# Patient Record
Sex: FEMALE | Race: ASIAN | NOT HISPANIC OR LATINO
[De-identification: names, ages, dates, MRNs, and addresses within clinical notes are randomized per-mention and may not be internally consistent; named-entity substitution may affect disease eponyms.]

---

## 2020-07-10 PROBLEM — Z00.00 ENCOUNTER FOR PREVENTIVE HEALTH EXAMINATION: Status: ACTIVE | Noted: 2020-07-10

## 2020-08-11 ENCOUNTER — APPOINTMENT (OUTPATIENT)
Dept: NEPHROLOGY | Facility: CLINIC | Age: 75
End: 2020-08-11
Payer: MEDICARE

## 2020-08-11 VITALS
SYSTOLIC BLOOD PRESSURE: 134 MMHG | TEMPERATURE: 98 F | RESPIRATION RATE: 16 BRPM | HEIGHT: 59 IN | WEIGHT: 179 LBS | HEART RATE: 74 BPM | BODY MASS INDEX: 36.08 KG/M2 | OXYGEN SATURATION: 90 % | DIASTOLIC BLOOD PRESSURE: 64 MMHG

## 2020-08-11 VITALS — SYSTOLIC BLOOD PRESSURE: 130 MMHG | DIASTOLIC BLOOD PRESSURE: 62 MMHG

## 2020-08-11 DIAGNOSIS — Z83.3 FAMILY HISTORY OF DIABETES MELLITUS: ICD-10-CM

## 2020-08-11 DIAGNOSIS — Z63.4 DISAPPEARANCE AND DEATH OF FAMILY MEMBER: ICD-10-CM

## 2020-08-11 PROCEDURE — 99204 OFFICE O/P NEW MOD 45 MIN: CPT

## 2020-08-11 RX ORDER — LEVOTHYROXINE SODIUM 0.09 MG/1
88 TABLET ORAL DAILY
Refills: 0 | Status: ACTIVE | COMMUNITY

## 2020-08-11 SDOH — SOCIAL STABILITY - SOCIAL INSECURITY: DISSAPEARANCE AND DEATH OF FAMILY MEMBER: Z63.4

## 2020-08-11 NOTE — ASSESSMENT
[FreeTextEntry1] : Cathy Coleman is a 74-year old woman with a past medical history significant for hypertension, diabetes mellitus since 1976 with both peripheral neuropathy and diabetic retinopathy, anemia currently treated with Aranesp, congestive heart failure, and chronic kidney disease.   Her recent  BUN/creatinine levels have trended as follows:  87/3.59 (06/19/2020),  78/3.45 (07/10/2020), 76/3.3 (07/20/2020), 78/3.3 (08/10/2020).  Her urine microalbumin to creatinine ratio was 3239 mcg per mg on 11/23/2019.  Ms. Coleman takes losartan-HCTZ and has for years.  She was taking furosemide 40 mg daily though recently increased the dose due to her recent fluid retention and worsening dyspnea with exertion.  \par \par \par IMPRESSION:\par \par (1) Stage IV CKD.  I am unsure if the serum creatinine values above are new or are just part of a long progression.  The presence of nephrotic range proteinuria and long history of diabetes mellitus is highly suggestive of diabetic nephropathy, a progressive disease.  Mr. Coleman is tired (with anemia)  though has not other uremic signs or symptoms.  \par \par (2) Anemia.  Although I can not rule out other etiologies at this time, this level of CKD can surely result in signficant anemia.  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  Well controlled.\par \par (4) Proteinuria.  See above.  Can not rule out other etiologies.\par \par (5) CHF/edema.  Ms. Coleman appears to be in congestive heart failure.  She recently increased her furosemide from 40 mg daily to 80 mg daily.\par \par (6) Hyperkalemia.\par \par RECOMMENDATIONS:\par \par (1) I will obtain the last 1-2 years of chemistries from your office.\par (2) i will review Dr. Rex Eaton's full workup. \par (3) Ms. Coleman will get the name of her cardiologist to me.  I would like to see his impression and obtain the result of her most recent echocardiogram. She last saw him in 2019.  I will call the cardiology group at [facility] and find out the results of the recent echocardiogram and hospital workup. \par (4) I ordered a full nephrotic range proteinuria workup and renal ultrasound.\par (5) Continue the furosemide 80 mg once daily.  She will go back to the 40 mg tablets after a 10 pound weight loss.\par (6) Keep salt out of the diet.\par (7) Avoid the following foods: potatoes, tomatoes, avocados, melons, bananas, oranges. \par (8) Ms. Coleman might very well benefit from intravenous iron\par \par I will see Ms. Coleman in one month.  She will obtain the lab studies in 1 week.

## 2020-08-11 NOTE — HISTORY OF PRESENT ILLNESS
[FreeTextEntry1] : Cathy Coleman is a 74-year old woman initially from [facility] who emigrated to the [place] in [place].  She has a past medical history significant for hypertension, diabetes mellitus since 1976 with both peripheral neuropathy and diabetic retinopathy, anemia thought to be due to a combination of iron deficiency and kidney disease currently treated with Aranesp, congestive heart failure, and chronic kidney disease.  She sees Dr. Rex Eaton of hematology.  Her BUN/creatinine levels have trended as follows:  87/3.59 (06/19/2020),  78/3.45 (07/10/2020), 76/3.3 (07/20/2020), 78/3.3 (08/10/2020).  Her urine microalbumin to creatinine ratio was 3239 mcg per mg on 11/23/2019.  Ms. Coleman feels "a little weak" though otherwise okay.  Her energy levels have been "down for the last 3 or 4 months". \par \par Ms. Coleman was admitted to [facility] in October 2019.  During that admission she had her furosemide dose reduced to 40 mg daily.  In addition, she has taken losartan-HCTZ for a number of years.

## 2020-08-11 NOTE — REVIEW OF SYSTEMS
[Feeling Tired] : feeling tired [Lower Ext Edema] : lower extremity edema [Cough] : cough [SOB on Exertion] : shortness of breath during exertion [As Noted in HPI] : as noted in HPI [Negative] : Musculoskeletal [Chills] : no chills [Fever] : no fever [FreeTextEntry2] : weight gain over the past few weeks "I think I retain water" [FreeTextEntry6] : no recent episodes of PND, sleeps on 2 pillows, denies orthopnea [FreeTextEntry7] : loose stools twice daily

## 2020-08-11 NOTE — CONSULT LETTER
[Dear  ___] : Dear  [unfilled], [Consult Letter:] : I had the pleasure of evaluating your patient, [unfilled]. [Sincerely,] : Sincerely, [Consult Closing:] : Thank you very much for allowing me to participate in the care of this patient.  If you have any questions, please do not hesitate to contact me. [DrKarin  ___] : Dr. HERNANDEZ [FreeTextEntry3] : Joe

## 2020-08-11 NOTE — PHYSICAL EXAM
[General Appearance - In No Acute Distress] : in no acute distress [General Appearance - Alert] : alert [Extraocular Movements] : extraocular movements were intact [Sclera] : the sclera and conjunctiva were normal [Respiration, Rhythm And Depth] : normal respiratory rhythm and effort [] : no respiratory distress [Exaggerated Use Of Accessory Muscles For Inspiration] : no accessory muscle use [Heart Rate And Rhythm] : heart rate was normal and rhythm regular [Heart Sounds] : normal S1 and S2 [Heart Sounds Gallop] : no gallops [Heart Sounds Pericardial Friction Rub] : no pericardial rub [Bowel Sounds] : normal bowel sounds [Abdomen Tenderness] : non-tender [No CVA Tenderness] : no ~M costovertebral angle tenderness [No Spinal Tenderness] : no spinal tenderness [Nail Clubbing] : no clubbing  or cyanosis of the fingernails [Involuntary Movements] : no involuntary movements were seen [No Focal Deficits] : no focal deficits [Skin Color & Pigmentation] : normal skin color and pigmentation [Affect] : the affect was normal [Impaired Insight] : insight and judgment were intact [Oriented To Time, Place, And Person] : oriented to person, place, and time [Mood] : the mood was normal [FreeTextEntry1] : no asterixis or clonus

## 2020-10-01 ENCOUNTER — APPOINTMENT (OUTPATIENT)
Dept: NEPHROLOGY | Facility: CLINIC | Age: 75
End: 2020-10-01
Payer: MEDICARE

## 2020-10-01 VITALS
RESPIRATION RATE: 16 BRPM | TEMPERATURE: 97.7 F | WEIGHT: 170 LBS | BODY MASS INDEX: 34.27 KG/M2 | DIASTOLIC BLOOD PRESSURE: 72 MMHG | SYSTOLIC BLOOD PRESSURE: 132 MMHG | OXYGEN SATURATION: 96 % | HEIGHT: 59 IN | HEART RATE: 74 BPM

## 2020-10-01 VITALS — DIASTOLIC BLOOD PRESSURE: 74 MMHG | SYSTOLIC BLOOD PRESSURE: 128 MMHG

## 2020-10-01 DIAGNOSIS — E11.9 TYPE 2 DIABETES MELLITUS W/OUT COMPLICATIONS: ICD-10-CM

## 2020-10-01 DIAGNOSIS — R53.83 OTHER FATIGUE: ICD-10-CM

## 2020-10-01 DIAGNOSIS — I10 ESSENTIAL (PRIMARY) HYPERTENSION: ICD-10-CM

## 2020-10-01 PROCEDURE — 99214 OFFICE O/P EST MOD 30 MIN: CPT

## 2020-10-01 NOTE — CONSULT LETTER
[Dear  ___] : Dear  [unfilled], [Consult Letter:] : I had the pleasure of evaluating your patient, [unfilled]. [Consult Closing:] : Thank you very much for allowing me to participate in the care of this patient.  If you have any questions, please do not hesitate to contact me. [Sincerely,] : Sincerely, [FreeTextEntry3] : Joe [DrKarin  ___] : Dr. HERNANDEZ

## 2020-10-01 NOTE — REVIEW OF SYSTEMS
[Fever] : no fever [Chills] : no chills [Feeling Tired] : feeling tired [Lower Ext Edema] : lower extremity edema [Cough] : cough [SOB on Exertion] : shortness of breath during exertion [Itching] : itching [As Noted in HPI] : as noted in HPI [Easy Bruising] : a tendency for easy bruising [Negative] : Endocrine [FreeTextEntry4] : occasional "runny nose" [FreeTextEntry5] : improved [FreeTextEntry6] : no recent episodes of PND, sleeps on 2 pillows, denies orthopnea, short of breath when climbing stairs [FreeTextEntry7] : loose stools twice daily [FreeTextEntry9] : cramping in the legs "mostly when I go to bed" [de-identified] : "sometimes my legs"

## 2020-10-01 NOTE — HISTORY OF PRESENT ILLNESS
[FreeTextEntry1] : Cathy Coleman is a 75-year old woman initially from [facility] who emigrated to the [place] in [place].  She has a past medical history significant for hypertension, diabetes mellitus since 1976 with both peripheral neuropathy and diabetic retinopathy, anemia thought to be due to a combination of iron deficiency and kidney disease currently treated with Aranesp, congestive heart failure, and chronic kidney disease.  She sees Dr. Rex Eaton of hematology.  Her BUN/creatinine levels have trended as follows:  87/3.59 (06/19/2020),  78/3.45 (07/10/2020), 76/3.3 (07/20/2020), 78/3.3 (08/10/2020).  Her urine microalbumin to creatinine ratio was 3239 mcg per mg on 11/23/2019.  \par \par Ms. Coleman was admitted to [facility] in October 2019.  During that admission she had her furosemide dose reduced to 40 mg daily.  In addition, she has taken losartan-HCTZ for a number of years.\par \par Ms. Coleman is here for follow up.  She feels well.  She denies having any nausea or vomiting.  She has an occasional cough and develops dyspnea only when climbing stairs. She is "unsteady" on her feet. She has not had any falls.

## 2020-10-01 NOTE — PHYSICAL EXAM
[General Appearance - Alert] : alert [General Appearance - In No Acute Distress] : in no acute distress [Sclera] : the sclera and conjunctiva were normal [Extraocular Movements] : extraocular movements were intact [Neck Appearance] : the appearance of the neck was normal [] : no respiratory distress [Respiration, Rhythm And Depth] : normal respiratory rhythm and effort [Exaggerated Use Of Accessory Muscles For Inspiration] : no accessory muscle use [Heart Rate And Rhythm] : heart rate was normal and rhythm regular [Heart Sounds] : normal S1 and S2 [Heart Sounds Gallop] : no gallops [Heart Sounds Pericardial Friction Rub] : no pericardial rub [Full Pulse] : the pedal pulses are present [Bowel Sounds] : normal bowel sounds [Abdomen Tenderness] : non-tender [No CVA Tenderness] : no ~M costovertebral angle tenderness [No Spinal Tenderness] : no spinal tenderness [Nail Clubbing] : no clubbing  or cyanosis of the fingernails [Involuntary Movements] : no involuntary movements were seen [Skin Color & Pigmentation] : normal skin color and pigmentation [No Focal Deficits] : no focal deficits [FreeTextEntry1] : no asterixis or clonus [Oriented To Time, Place, And Person] : oriented to person, place, and time [Impaired Insight] : insight and judgment were intact [Affect] : the affect was normal [Mood] : the mood was normal

## 2020-10-01 NOTE — ASSESSMENT
[FreeTextEntry1] : Cathy Coleman is a 75-year old woman with a past medical history significant for hypertension, diabetes mellitus since 1976 with both peripheral neuropathy and diabetic retinopathy, anemia currently treated with Aranesp, congestive heart failure, and chronic kidney disease.   Her recent  BUN/creatinine levels have trended as follows:  87/3.59 (06/19/2020),  78/3.45 (07/10/2020), 76/3.3 (07/20/2020), 78/3.3 (08/10/2020), and most recently 98/4.7 (08/31/2020) with a serum potassium of 5.9 meq per liter.   Her urine microalbumin to creatinine ratio was 3239 mcg per mg on 11/23/2019.  Ms. Coleman takes losartan-HCTZ and has for years.  She was taking furosemide 40 mg daily though increased the dose to 80 mg once daily for 5 days prior to her first visit with me on 08/11/2020 (due to her recent fluid retention and worsening dyspnea with exertion)..  \par \par \par IMPRESSION:\par \par (1) Stage IV CKD with DARI.   I am unsure if the serum creatinine values above are new or are just part of a long progression.  The presence of nephrotic range proteinuria and long history of diabetes mellitus is highly suggestive of diabetic nephropathy, a progressive disease.  Mr. Coleman is tired (with anemia)  though has no other uremic signs or symptoms.  \par \par (2) Anemia.  Although I can not rule out other etiologies at this time, this level of CKD can surely result in significant anemia.  Ms. Coleman had functional iron deficiency.  This has improved with oral iron (TSAT 11.5 --> 31%%, ferritin 215.1 --> 303 ng/mL)\par \par (3) Hypertension.  Well controlled.\par \par (4) Proteinuria.  See above.  Can not rule out other etiologies. Unfortunately the nephrotic range proteinuria workup was not done. \par \par (5) CHF/edema.  Ms. Coleman has bibasilar rales.  I suspect the furosemide is not as effective with the serum creatinine of 4.7 meq per liter.\par \par (6) Hyperkalemia.\par \par RECOMMENDATIONS:\par \par (1) I will obtain the last 1-2 years of chemistries from your office.\par (2) I ordered a full nephrotic range proteinuria\par (3) Urinalysis and random urine protein to creatinine ratio.\par (4) Stop taking losartan/HCTZ for now.  Follow blood pressures at home. \par (5) Renal ultrasound\par (6)  Avoid the following foods: potatoes, tomatoes, avocados, melons, bananas, oranges. \par \par I will see Ms. Coleman in one month.  She will obtain the lab studies in 2 weeks.

## 2020-12-10 ENCOUNTER — APPOINTMENT (OUTPATIENT)
Dept: NEPHROLOGY | Facility: CLINIC | Age: 75
End: 2020-12-10

## 2021-01-19 ENCOUNTER — APPOINTMENT (OUTPATIENT)
Dept: NEPHROLOGY | Facility: CLINIC | Age: 76
End: 2021-01-19
Payer: MEDICARE

## 2021-01-19 ENCOUNTER — APPOINTMENT (OUTPATIENT)
Dept: NEPHROLOGY | Facility: CLINIC | Age: 76
End: 2021-01-19

## 2021-01-19 VITALS
HEART RATE: 90 BPM | BODY MASS INDEX: 31.65 KG/M2 | TEMPERATURE: 98 F | OXYGEN SATURATION: 98 % | DIASTOLIC BLOOD PRESSURE: 60 MMHG | RESPIRATION RATE: 16 BRPM | SYSTOLIC BLOOD PRESSURE: 126 MMHG | HEIGHT: 59 IN | WEIGHT: 157 LBS

## 2021-01-19 DIAGNOSIS — D50.9 IRON DEFICIENCY ANEMIA, UNSPECIFIED: ICD-10-CM

## 2021-01-19 PROCEDURE — 99072 ADDL SUPL MATRL&STAF TM PHE: CPT

## 2021-01-19 PROCEDURE — 99214 OFFICE O/P EST MOD 30 MIN: CPT

## 2021-01-19 RX ORDER — LOSARTAN POTASSIUM AND HYDROCHLOROTHIAZIDE 12.5; 1 MG/1; MG/1
100-12.5 TABLET ORAL DAILY
Refills: 0 | Status: DISCONTINUED | COMMUNITY
End: 2021-01-19

## 2021-01-19 RX ORDER — DORZOLAMIDE HYDROCHLORIDE TIMOLOL MALEATE 20; 5 MG/ML; MG/ML
22.3-6.8 SOLUTION/ DROPS OPHTHALMIC TWICE DAILY
Refills: 0 | Status: ACTIVE | COMMUNITY

## 2021-01-19 RX ORDER — FUROSEMIDE 40 MG/1
40 TABLET ORAL DAILY
Refills: 0 | Status: DISCONTINUED | COMMUNITY
End: 2021-01-19

## 2021-01-19 RX ORDER — MIDODRINE HYDROCHLORIDE 10 MG/1
10 TABLET ORAL
Refills: 0 | Status: ACTIVE | COMMUNITY

## 2021-01-19 RX ORDER — INSULIN GLARGINE 100 [IU]/ML
100 INJECTION, SOLUTION SUBCUTANEOUS
Refills: 0 | Status: ACTIVE | COMMUNITY

## 2021-01-19 RX ORDER — IPRATROPIUM BROMIDE 0.5 MG/2.5ML
0.02 SOLUTION RESPIRATORY (INHALATION)
Refills: 0 | Status: DISCONTINUED | COMMUNITY

## 2021-01-19 RX ORDER — BUDESONIDE 0.25 MG/2ML
0.25 SUSPENSION RESPIRATORY (INHALATION) TWICE DAILY
Refills: 0 | Status: ACTIVE | COMMUNITY

## 2021-01-19 NOTE — CONSULT LETTER
[Dear  ___] : Dear  [unfilled], [Consult Letter:] : I had the pleasure of evaluating your patient, [unfilled]. [Consult Closing:] : Thank you very much for allowing me to participate in the care of this patient.  If you have any questions, please do not hesitate to contact me. [Sincerely,] : Sincerely, [DrKarin  ___] : Dr. HERNANDEZ [DrKarin ___] : Dr. HERNANDEZ [FreeTextEntry3] : Joe

## 2021-01-19 NOTE — REVIEW OF SYSTEMS
[Lower Ext Edema] : lower extremity edema [As Noted in HPI] : as noted in HPI [Negative] : Endocrine [Fever] : no fever [Chills] : no chills [Shortness Of Breath] : no shortness of breath [Itching] : no itching [FreeTextEntry7] : loose stools twice daily [FreeTextEntry9] : cramping in the legs "mostly when I go to bed" [de-identified] : weak lower extremities [de-identified] : "sometimes my legs" [de-identified] : anemia is being treated

## 2021-01-19 NOTE — HISTORY OF PRESENT ILLNESS
[FreeTextEntry1] : Cathy Coleman is a 75-year old woman initially from [facility] who emigrated to the [place] in [place].  She has a past medical history significant for hypertension, diabetes mellitus since 1976 with both peripheral neuropathy and diabetic retinopathy, anemia thought to be due to a combination of iron deficiency and kidney disease currently treated with Aranesp, congestive heart failure, and chronic kidney disease.  She sees Dr. Rex Eaton of hematology.  Her BUN/creatinine levels have trended as follows:  87/3.59 (06/19/2020),  78/3.45 (07/10/2020), 76/3.3 (07/20/2020), 78/3.3 (08/10/2020).  Her urine microalbumin to creatinine ratio was 3239 mcg per mg on 11/23/2019.  \par \par Ms. Coleman is here for follow up.  She was admitted to [facility] in November 2020 with decopensated congestive heart failure.  She was started on intermittent hemodialyssis though did not tolerate UF.  She ultimately received CVVH at [facility] and was discharged on Bumex 6 mg TID.  Her serum creatinine has been running in the 7's.  She last received hemodialysis in the middle of December 2020.  Currently, Ms. Coleman is feeling "okay". She is a bit lightheaded upon wakening and her balance is poor.  She denies any nausea or vomiting.  Her appetite is good. \par

## 2021-01-19 NOTE — QUALITY MEASURES
[PTH level measured within last] : patient's PTH level measured within the last 12 months [Patient has proteinuria greater than] : patient has proteinuria greater than 1 gm (spot urine/protein creatinine ratio or a 24hr collection)

## 2021-01-19 NOTE — PHYSICAL EXAM
[General Appearance - Alert] : alert [General Appearance - In No Acute Distress] : in no acute distress [Sclera] : the sclera and conjunctiva were normal [Extraocular Movements] : extraocular movements were intact [Neck Appearance] : the appearance of the neck was normal [] : no respiratory distress [Respiration, Rhythm And Depth] : normal respiratory rhythm and effort [Exaggerated Use Of Accessory Muscles For Inspiration] : no accessory muscle use [Heart Rate And Rhythm] : heart rate was normal and rhythm regular [Heart Sounds] : normal S1 and S2 [Heart Sounds Gallop] : no gallops [Heart Sounds Pericardial Friction Rub] : no pericardial rub [Full Pulse] : the pedal pulses are present [Bowel Sounds] : normal bowel sounds [Abdomen Tenderness] : non-tender [Nail Clubbing] : no clubbing  or cyanosis of the fingernails [Involuntary Movements] : no involuntary movements were seen [Skin Color & Pigmentation] : normal skin color and pigmentation [No Focal Deficits] : no focal deficits [Impaired Insight] : insight and judgment were intact [Oriented To Time, Place, And Person] : oriented to person, place, and time [Affect] : the affect was normal [Mood] : the mood was normal [Edema] : there was no peripheral edema [Dialysis Catheter] : dialysis catheter [FreeTextEntry1] : no asterixis or clonus

## 2021-01-19 NOTE — ASSESSMENT
[FreeTextEntry1] : Cathy Coleman is a 75-year old woman with a past medical history significant for hypertension, diabetes mellitus since 1976 with both peripheral neuropathy and diabetic retinopathy, anemia currently treated with Aranesp, congestive heart failure, and chronic kidney disease.   Her  BUN/creatinine levels had trended as follows:  87/3.59 (06/19/2020),  78/3.45 (07/10/2020), 76/3.3 (07/20/2020), 78/3.3 (08/10/2020),  98/4.7 (08/31/2020).  She was admitted to [facility] where she was started on hemodialysis.  The day following the first hemodialysis session Ms. Coleman had a cardiac arrest.  She was revived and transferred to [facility] where she was treated for her CHF and progressive CKD via CVVH, and treated for pneumonia.  Ms. Coleman was discharged to rehabilitation ([facility]) where she recently started treatment for C. Diff. Colitis.  Her stools are no longer loose.  Her most recent serum creatinine levels have been running around 7 mg/dL.  She denies any uremic signs.  I could not find any uremic symptoms.  \par \par IMPRESSION:\par \par (1) Stage IV CKD progression on diuretics.  Currently stage V.  The presence of nephrotic range proteinuria and long history of diabetes mellitus is highly suggestive of diabetic nephropathy.  \par \par (2) Anemia.  Ms. Coleman has received Aranesp with Dr. Rex Eaton.  She has not received Aranesp in a few months. \par \par (3) Hypertension.  Well controlled.\par \par (4) Proteinuria.  See above.  Can not rule out other etiologies. Unfortunately the nephrotic range proteinuria workup was not done. \par \par (5) CHF/edema.  Ms. Coleman has lower lung field rales on exam. \par \par (6) Hyperkalemia.\par \par RECOMMENDATIONS:\par \par (1) Ms. Coleman should have the lab studies ordered below. \par (2) I suspect she will soon require the initiation of hemodialysis.\par (3) Given the cardiac arrest I hesitate to initiate hemodialysis at an outpatient center.  She will need to start dialysis in a hospital, ideally [facility] where I can follow her. \par \par THE TUNNELED DIALYSIS CATHETER NEEDS TO BE FLUSHED WITH NORMAL SALINE TWICE WEEKLY, AND THE BANDAGED CHANGED AT THIS TIME UNDER STERILE CONDITIONS.  BOTH NURSING AND THE PATIENT SHOULD MASK DURING THIS PROCEDURE. \par

## 2021-02-04 ENCOUNTER — APPOINTMENT (OUTPATIENT)
Dept: NEPHROLOGY | Facility: CLINIC | Age: 76
End: 2021-02-04

## 2021-03-02 ENCOUNTER — APPOINTMENT (OUTPATIENT)
Dept: NEPHROLOGY | Facility: CLINIC | Age: 76
End: 2021-03-02
Payer: MEDICARE

## 2021-03-02 VITALS
HEART RATE: 86 BPM | WEIGHT: 137.13 LBS | OXYGEN SATURATION: 95 % | HEIGHT: 59 IN | DIASTOLIC BLOOD PRESSURE: 62 MMHG | TEMPERATURE: 97.9 F | RESPIRATION RATE: 16 BRPM | SYSTOLIC BLOOD PRESSURE: 128 MMHG | BODY MASS INDEX: 27.64 KG/M2

## 2021-03-02 DIAGNOSIS — E79.0 HYPERURICEMIA W/OUT SIGNS OF INFLAMMATORY ARTHRITIS AND TOPHACEOUS DISEASE: ICD-10-CM

## 2021-03-02 DIAGNOSIS — E83.40 DISORDERS OF MAGNESIUM METABOLISM, UNSPECIFIED: ICD-10-CM

## 2021-03-02 DIAGNOSIS — D46.4 REFRACTORY ANEMIA, UNSPECIFIED: ICD-10-CM

## 2021-03-02 DIAGNOSIS — E55.9 VITAMIN D DEFICIENCY, UNSPECIFIED: ICD-10-CM

## 2021-03-02 DIAGNOSIS — R80.9 PROTEINURIA, UNSPECIFIED: ICD-10-CM

## 2021-03-02 PROCEDURE — 99214 OFFICE O/P EST MOD 30 MIN: CPT

## 2021-03-02 PROCEDURE — 99072 ADDL SUPL MATRL&STAF TM PHE: CPT

## 2021-03-02 RX ORDER — INSULIN HUMAN 100 [IU]/ML
100 INJECTION, SUSPENSION SUBCUTANEOUS
Refills: 0 | Status: DISCONTINUED | COMMUNITY
End: 2021-03-02

## 2021-03-02 RX ORDER — SIMVASTATIN 20 MG/1
20 TABLET, FILM COATED ORAL DAILY
Refills: 0 | Status: DISCONTINUED | COMMUNITY
End: 2021-03-02

## 2021-03-02 RX ORDER — VANCOMYCIN HYDROCHLORIDE 1 G/200ML
1 INJECTION, SOLUTION INTRAVENOUS
Refills: 0 | Status: DISCONTINUED | COMMUNITY
End: 2021-03-02

## 2021-03-02 RX ORDER — SODIUM CHLORIDE 0.65 %
0.65 AEROSOL, SPRAY (ML) NASAL
Refills: 0 | Status: ACTIVE | COMMUNITY

## 2021-03-02 NOTE — PHYSICAL EXAM
[General Appearance - Alert] : alert [General Appearance - In No Acute Distress] : in no acute distress [Sclera] : the sclera and conjunctiva were normal [Extraocular Movements] : extraocular movements were intact [Neck Appearance] : the appearance of the neck was normal [] : no respiratory distress [Respiration, Rhythm And Depth] : normal respiratory rhythm and effort [Exaggerated Use Of Accessory Muscles For Inspiration] : no accessory muscle use [Heart Rate And Rhythm] : heart rate was normal and rhythm regular [Heart Sounds] : normal S1 and S2 [Heart Sounds Gallop] : no gallops [Heart Sounds Pericardial Friction Rub] : no pericardial rub [Full Pulse] : the pedal pulses are present [Edema] : there was no peripheral edema [Bowel Sounds] : normal bowel sounds [Abdomen Tenderness] : non-tender [Nail Clubbing] : no clubbing  or cyanosis of the fingernails [Involuntary Movements] : no involuntary movements were seen [Dialysis Catheter] : dialysis catheter [Skin Color & Pigmentation] : normal skin color and pigmentation [No Focal Deficits] : no focal deficits [Oriented To Time, Place, And Person] : oriented to person, place, and time [Impaired Insight] : insight and judgment were intact [Affect] : the affect was normal [Mood] : the mood was normal [Abdomen Soft] : soft [No CVA Tenderness] : no ~M costovertebral angle tenderness [No Spinal Tenderness] : no spinal tenderness [FreeTextEntry1] : no asterixis or clonus

## 2021-03-02 NOTE — HISTORY OF PRESENT ILLNESS
[FreeTextEntry1] : Cathy Coleman is a 75-year old woman initially from [facility] who emigrated to [place] in [place].  She has a past medical history significant for hypertension, diabetes mellitus since 1976 with both peripheral neuropathy and diabetic retinopathy, anemia thought to be due to a combination of iron deficiency and kidney disease currently treated with Aranesp, congestive heart failure, and chronic kidney disease.  She sees Dr. Rex Eaton of hematology.    Her urine microalbumin to creatinine ratio was 3239 mcg per mg on 11/23/2019.  \par \par Ms. Coleman is here for follow up.  She was admitted to [facility] in November 2020 with decompensated congestive heart failure.  She was started on intermittent hemodialyssis though did not tolerate UF.  She ultimately received CVVH at [facility] and was discharged on Bumex 6 mg TID.  Her serum creatinine had been running in the 7's.  She last received hemodialysis in the middle of December 2020.  \par \par I called Ms. Coleman on 01/25/2021 during her stay in [facility] in [place] regarding a Hb level of 6.8 g/dL.  I spoke with Nelson Pedraza RN.  She was feeling dizzy with a headache.  At my request she was sent to [facility] where she received 2 units of packed red cells.   Currently, Ms. Coleman is feeling "okay". She is able to ambulate short distances with help.  Her appetite is "not that good".  She has nausea on some days though has no vomiting.  It "passes" after drinking water or juice. \par

## 2021-03-02 NOTE — REVIEW OF SYSTEMS
[Lower Ext Edema] : lower extremity edema [As Noted in HPI] : as noted in HPI [Negative] : Gastrointestinal [Fever] : no fever [Chills] : no chills [Shortness Of Breath] : no shortness of breath [Itching] : no itching [FreeTextEntry7] : diarrhea stopped, currently with soft stools [FreeTextEntry8] : nocturia 2 [FreeTextEntry9] : cramping in legs has improved [de-identified] : "sometimes my legs" [de-identified] : weak lower extremities [de-identified] : anemia is being treated

## 2021-03-02 NOTE — ASSESSMENT
[FreeTextEntry1] : Cathy Coleman is a 75-year old woman with a past medical history significant for hypertension, diabetes mellitus since 1976 with both peripheral neuropathy and diabetic retinopathy, anemia currently treated with Aranesp, congestive heart failure, and chronic kidney disease.   Her  BUN/creatinine levels had trended as follows:  87/3.59 (06/19/2020),  78/3.45 (07/10/2020), 76/3.3 (07/20/2020), 78/3.3 (08/10/2020),  98/4.7 (08/31/2020), 89/5.30  She was admitted to [facility] where she was started on hemodialysis.  The day following the first hemodialysis session Ms. Coleman had a cardiac arrest.  She was revived and transferred to [facility] where she was treated for her CHF and progressive CKD via CVVH, and treated for pneumonia.  Ms. Coleman was discharged to rehabilitation ([facility]) where she recently started treatment for C. Diff. Colitis.  Her stools are no longer loose.  Following discharge her serum creatinine levels were running around 7 mg/dL.  The recent BUN/creatinine levels are 89/5.30 (03/01/2021). Ms. Coleman is without any uremic signs or symptoms. \par \par IMPRESSION:\par \par (1) Stage IV CKD progression on diuretics.  Currently stage V.  The presence of nephrotic range proteinuria and long history of diabetes mellitus is highly suggestive of diabetic nephropathy.  \par \par (2) Anemia.  Ms. Coleman received PRBC transfusions in late January.  Her Hb trend is as follows:  8.9 g/dL (02/15/2021),  7.9 g/dL (03/01/2021).. She is receiving Aranesp 60 mcg weekly.  I am unsure how long she has been on Aranesp. Her TSAT was 60% on 02/27/2021.  \par \par (3) Hypertension.  Well controlled.\par \par (4) Proteinuria.  See above.  Can not rule out other etiologies. U\par \par (5) CHF/edema.  Controlled. \par \par (6) Hyperkalemia. Controlled.  The recent K+ level was 3.5 meq/L (03/01/2021)\par \par (7) Tunneled right chest dialysis catheter. \par \par (8) Elevated white count.  Unclear etiology.  This needs follow up.\par \par RECOMMENDATIONS:\par \par (1) Ms. Coleman's son, Benson, will call the NH and find out how long the Aranesp has been on board.  I will make recommendations about changing the dose of the Aranesp as needed. \par (2) Ms. Coleman should have the catheter cared for as below.\par (3) Ms. Coleman should see Dr. Eaton after discharge. \par (4) I ordered the tests below to be done in around 2 weeks.\par (5) Given the cardiac arrest I hesitate to initiate hemodialysis at an outpatient center when it is needed.  She will need to start dialysis in a hospital, ideally [facility] where I can follow her. \par \par THE TUNNELED DIALYSIS CATHETER NEEDS TO BE FLUSHED WITH NORMAL SALINE TWICE WEEKLY, AND THE BANDAGED CHANGED AT THIS TIME UNDER STERILE CONDITIONS.  BOTH NURSING AND THE PATIENT SHOULD MASK DURING THIS PROCEDURE. \par

## 2021-04-20 ENCOUNTER — APPOINTMENT (OUTPATIENT)
Dept: NEPHROLOGY | Facility: CLINIC | Age: 76
End: 2021-04-20
Payer: MEDICARE

## 2021-04-20 VITALS
HEIGHT: 59 IN | DIASTOLIC BLOOD PRESSURE: 64 MMHG | SYSTOLIC BLOOD PRESSURE: 128 MMHG | TEMPERATURE: 97.3 F | OXYGEN SATURATION: 91 % | HEART RATE: 78 BPM | RESPIRATION RATE: 15 BRPM

## 2021-04-20 DIAGNOSIS — N19 UNSPECIFIED KIDNEY FAILURE: ICD-10-CM

## 2021-04-20 DIAGNOSIS — N17.9 ACUTE KIDNEY FAILURE, UNSPECIFIED: ICD-10-CM

## 2021-04-20 DIAGNOSIS — Z87.898 PERSONAL HISTORY OF OTHER SPECIFIED CONDITIONS: ICD-10-CM

## 2021-04-20 DIAGNOSIS — N18.5 CHRONIC KIDNEY DISEASE, STAGE 5: ICD-10-CM

## 2021-04-20 DIAGNOSIS — N18.9 CHRONIC KIDNEY DISEASE, UNSPECIFIED: ICD-10-CM

## 2021-04-20 DIAGNOSIS — E83.39 OTHER DISORDERS OF PHOSPHORUS METABOLISM: ICD-10-CM

## 2021-04-20 DIAGNOSIS — E21.3 HYPERPARATHYROIDISM, UNSPECIFIED: ICD-10-CM

## 2021-04-20 DIAGNOSIS — D63.1 CHRONIC KIDNEY DISEASE, UNSPECIFIED: ICD-10-CM

## 2021-04-20 PROCEDURE — 99072 ADDL SUPL MATRL&STAF TM PHE: CPT

## 2021-04-20 PROCEDURE — 99214 OFFICE O/P EST MOD 30 MIN: CPT

## 2021-04-20 RX ORDER — SEVELAMER CARBONATE 800 MG/1
800 TABLET, FILM COATED ORAL 3 TIMES DAILY
Refills: 0 | Status: DISCONTINUED | COMMUNITY
End: 2021-04-20

## 2021-04-20 RX ORDER — AZITHROMYCIN MONOHYDRATE 500 MG/5ML
500 INJECTION, POWDER, LYOPHILIZED, FOR SOLUTION INTRAVENOUS
Refills: 0 | Status: DISCONTINUED | COMMUNITY
End: 2021-04-20

## 2021-04-20 RX ORDER — LORATADINE 5 MG
17 TABLET,CHEWABLE ORAL DAILY
Refills: 0 | Status: DISCONTINUED | COMMUNITY
End: 2021-04-20

## 2021-04-20 RX ORDER — CHLORHEXIDINE GLUCONATE 4 %
325 (65 FE) LIQUID (ML) TOPICAL DAILY
Refills: 0 | Status: DISCONTINUED | COMMUNITY
End: 2021-04-20

## 2021-04-20 RX ORDER — ONDANSETRON 2 MG/ML
INJECTION INTRAMUSCULAR; INTRAVENOUS
Refills: 0 | Status: DISCONTINUED | COMMUNITY
End: 2021-04-20

## 2021-04-20 RX ORDER — BISMUTH SUBSALICYLATE 262 MG
262 TABLET,CHEWABLE ORAL EVERY 6 HOURS
Refills: 0 | Status: DISCONTINUED | COMMUNITY
End: 2021-04-20

## 2021-04-20 RX ORDER — CALCIUM CITRATE/VITAMIN D3 315MG-6.25
TABLET ORAL TWICE DAILY
Refills: 0 | Status: DISCONTINUED | COMMUNITY
End: 2021-04-20

## 2021-04-20 RX ORDER — ONDANSETRON 4 MG/1
4 TABLET, ORALLY DISINTEGRATING ORAL
Refills: 0 | Status: DISCONTINUED | COMMUNITY
End: 2021-04-20

## 2021-04-20 RX ORDER — GLUCOSAMINE HCL/CHONDROITIN SU 500-400 MG
3 CAPSULE ORAL AT BEDTIME
Refills: 0 | Status: DISCONTINUED | COMMUNITY
End: 2021-04-20

## 2021-04-20 RX ORDER — INSULIN LISPRO 100 U/ML
100 INJECTION, SOLUTION SUBCUTANEOUS
Refills: 0 | Status: DISCONTINUED | COMMUNITY
End: 2021-04-20

## 2021-04-20 NOTE — PHYSICAL EXAM
[General Appearance - Alert] : alert [General Appearance - In No Acute Distress] : in no acute distress [Sclera] : the sclera and conjunctiva were normal [Extraocular Movements] : extraocular movements were intact [Neck Appearance] : the appearance of the neck was normal [] : no respiratory distress [Respiration, Rhythm And Depth] : normal respiratory rhythm and effort [Exaggerated Use Of Accessory Muscles For Inspiration] : no accessory muscle use [Heart Rate And Rhythm] : heart rate was normal and rhythm regular [Heart Sounds] : normal S1 and S2 [Heart Sounds Gallop] : no gallops [Heart Sounds Pericardial Friction Rub] : no pericardial rub [Full Pulse] : the pedal pulses are present [Edema] : there was no peripheral edema [Bowel Sounds] : normal bowel sounds [Abdomen Soft] : soft [Abdomen Tenderness] : non-tender [Nail Clubbing] : no clubbing  or cyanosis of the fingernails [Involuntary Movements] : no involuntary movements were seen [Dialysis Catheter] : dialysis catheter [Skin Color & Pigmentation] : normal skin color and pigmentation [No Focal Deficits] : no focal deficits [Oriented To Time, Place, And Person] : oriented to person, place, and time [Impaired Insight] : insight and judgment were intact [Affect] : the affect was normal [Mood] : the mood was normal [FreeTextEntry1] : no asterixis or clonus

## 2021-04-20 NOTE — ASSESSMENT
[FreeTextEntry1] : Cathy Coleman is a 75-year old woman with a past medical history significant for hypertension, diabetes mellitus since 1976 with both peripheral neuropathy and diabetic retinopathy, anemia currently treated with Aranesp, congestive heart failure, and chronic kidney disease.   Her  BUN/creatinine levels had trended as follows:  87/3.59 (06/19/2020),  78/3.45 (07/10/2020), 76/3.3 (07/20/2020), 78/3.3 (08/10/2020),  98/4.7 (08/31/2020), 89/5.30  She was admitted to [facility] where she was started on hemodialysis.  The day following the first hemodialysis session Ms. Coleman had a cardiac arrest.  She was revived and transferred to [facility] where she was treated for her CHF and progressive CKD via CVVH, and treated for pneumonia.  Ms. Coleman was discharged to rehabilitation ([facility]) where she recently started treatment for C. Diff. Colitis.   The recent BUN/creatinine levels have trended as follows:  89/5.30 (03/01/2021), 145/6.42 (04/17/2021).  She is having mild uremic symptoms.  Her eGFR is 6 cc per minute. \par \par IMPRESSION:\par \par (1) Stage V CKD. Currently stage V.  The presence of nephrotic range proteinuria and long history of diabetes mellitus is highly suggestive of diabetic nephropathy.  She is having mild uremic symptoms.  I think Ms. Coleman will benefit from starting hemodialysis. \par \par (2) Anemia. The recent Hb is 7.1 g/dL.  Ms. Coleman denies any visible blood in her stools.  \par \par (3) Hypertension.  Well controlled.\par \par (4) Proteinuria.  See above.  Can not rule out other etiologies. U\par \par (5) CHF/edema.  Controlled. \par \par (6) Hyperkalemia. Controlled.  The recent K+ level was 4.6 meq/L\par \par (7) Tunneled right chest dialysis catheter.  This was recently exchanged. \par \par (8) Elevated white count.  Unclear etiology.  This appears to have resolved. \par \par (9) Hyperphosphatemia.  The serum phosphorous is 8.4 mg/dL.\par \par (10) Hyperparathyroidism.  The iPTH is quite elevated at 821 pg per mL.\par \par RECOMMENDATIONS:\par \par (1) I am recommending that Ms. Coleman initiate hemodialysis as an inpatient- given her risks.  I explained the risks of hemodialysis (nausea, vomiting, cramping, hypotension, mental status changes, infection, blood loss, abnormal cardiac rhythm, heart attack, and possible death) and the benefits (feeling better, improved appetite, likely resolution of nausea and vomiting, a prolonged life most likely).  All questions were answered. Ms. Coleman will think about this and call me after the weekend. \par (2) Ms. Coleman should have the catheter cared for weekly at [facility].\par (3) Start calcium acetate 667 mg, 2 tabs PO TID with meals.\par \par Ms. Coleman will call me later this week re: whether she wants to start HD. \par \par

## 2021-04-20 NOTE — CONSULT LETTER
[Consult Letter:] : I had the pleasure of evaluating your patient, [unfilled]. [Consult Closing:] : Thank you very much for allowing me to participate in the care of this patient.  If you have any questions, please do not hesitate to contact me. [Sincerely,] : Sincerely, [DrKarin  ___] : Dr. HERNANDEZ [DrKarin ___] : Dr. HERNANDEZ [Dear  ___] : Dear  [unfilled], [FreeTextEntry3] : Joe

## 2021-04-20 NOTE — HISTORY OF PRESENT ILLNESS
[FreeTextEntry1] : Cathy Coleman is a 75-year old woman initially from [facility] who emigrated to the [place] in [place].  She has a past medical history significant for hypertension, diabetes mellitus since 1976 with both peripheral neuropathy and diabetic retinopathy, anemia thought to be due to a combination of iron deficiency and kidney disease currently treated with Aranesp, congestive heart failure, and chronic kidney disease.  She sees Dr. Rex Eaton of hematology.    Her urine microalbumin to creatinine ratio was 3239 mcg per mg on 11/23/2019.  \par \par Ms. Coleman is here for follow up.  She was admitted to [facility] in November 2020 with decompensated congestive heart failure.  She was started on intermittent hemodialysis though did not tolerate UF.  She ultimately received CVVH at [facility] and was discharged on Bumex 6 mg TID.  Her serum creatinine had been running in the 7's.  She last received hemodialysis in the middle of December 2020.  \par \par Ms. Coleman is here for follow up.  Her Bumex dose was decreased one week ago after the pharmacist was concered about the dose being too high.  Ms. Coleman's weight has been stable for the past week. Ms. Coleman feels "okay" although she doesn't have energy to do anything.  Her appetite is "okay"  She received Aranesp last week with Dr. Rex Eaotn. \par

## 2021-04-20 NOTE — REVIEW OF SYSTEMS
[Lower Ext Edema] : lower extremity edema [As Noted in HPI] : as noted in HPI [Feeling Tired] : feeling tired [Itching] : itching [Negative] : Respiratory [FreeTextEntry7] : Has nausea around once weekly with vomiting [FreeTextEntry8] : nocturia x  "so many times", sleeps poorly [FreeTextEntry9] : cramping in right lowe extremity daily [de-identified] : lower extremities [de-identified] : weak lower extremities [de-identified] : anemia is being treated

## 2021-04-26 ENCOUNTER — APPOINTMENT (OUTPATIENT)
Dept: NEPHROLOGY | Facility: CLINIC | Age: 76
End: 2021-04-26

## 2021-06-22 ENCOUNTER — APPOINTMENT (OUTPATIENT)
Dept: NEPHROLOGY | Facility: CLINIC | Age: 76
End: 2021-06-22

## 2021-09-03 ENCOUNTER — APPOINTMENT (OUTPATIENT)
Dept: VASCULAR SURGERY | Facility: CLINIC | Age: 76
End: 2021-09-03
Payer: MEDICARE

## 2021-09-03 VITALS
TEMPERATURE: 96.2 F | WEIGHT: 140 LBS | HEIGHT: 59 IN | HEART RATE: 78 BPM | DIASTOLIC BLOOD PRESSURE: 68 MMHG | OXYGEN SATURATION: 83 % | RESPIRATION RATE: 16 BRPM | SYSTOLIC BLOOD PRESSURE: 166 MMHG | BODY MASS INDEX: 28.22 KG/M2

## 2021-09-03 PROCEDURE — 99203 OFFICE O/P NEW LOW 30 MIN: CPT

## 2021-09-03 RX ORDER — BUMETANIDE 1 MG/1
1 TABLET ORAL
Refills: 0 | Status: DISCONTINUED | COMMUNITY
End: 2021-09-03

## 2021-09-03 RX ORDER — ACETAMINOPHEN 325 MG/1
325 TABLET, FILM COATED ORAL
Refills: 0 | Status: DISCONTINUED | COMMUNITY
End: 2021-09-03

## 2021-09-03 RX ORDER — ATORVASTATIN CALCIUM 80 MG/1
80 TABLET, FILM COATED ORAL AT BEDTIME
Refills: 0 | Status: DISCONTINUED | COMMUNITY
End: 2021-09-03

## 2021-09-03 RX ORDER — FAMOTIDINE 20 MG/1
20 TABLET, FILM COATED ORAL
Refills: 0 | Status: DISCONTINUED | COMMUNITY
End: 2021-09-03

## 2021-09-06 NOTE — HISTORY OF PRESENT ILLNESS
[FreeTextEntry1] : 77 yo female referred by Dr. Mireles for HD access. She is right hand dominant. She is currently undergoing HD via a [facility] PC. SHe is accompanied by her son. Her son reports that she has had several catheter infections since HD has been initiated. She denies a history of a PPM.

## 2021-09-06 NOTE — ASSESSMENT
[FreeTextEntry1] : 77 yo female  with ESRD. She is right hand dominant. I am uncertain if she is a candidate for a left upper extremity AVF. I was unable to palpate a cephalic vein at the antecubital fossa or the wrist. I discussed my recommendation with the patient and her son. An upper extremity vein  mapping was ordered. I discussed the procedure in detail with the patient. All of their questions were answered. She was instructed to protect his left arm from blood draws and IV insertions.  She  will continue HD via the [facility]. An final operative plan will be formed when the results of the vein mapping are available. \par \par

## 2021-09-06 NOTE — REVIEW OF SYSTEMS
[Fever] : no fever [Feeling Tired] : feeling tired [Chills] : no chills [Limb Weakness] : no limb weakness [Difficulty Walking] : difficulty walking

## 2021-09-06 NOTE — PHYSICAL EXAM
[Normal Breath Sounds] : Normal breath sounds [Normal Rate and Rhythm] : normal rate and rhythm [2+] : right 2+ [Ankle Swelling (On Exam)] : not present [Ankle Swelling Bilaterally] : bilaterally  [de-identified] : Awake and Alert [de-identified] : RENEE PC in place [de-identified] : bilateral hands warm, palpable cord right antecubital fossa [de-identified] : No gross motor or sensory deficits bilateral hands [de-identified] : Appropriate

## 2021-09-27 ENCOUNTER — APPOINTMENT (OUTPATIENT)
Dept: HEART AND VASCULAR | Facility: CLINIC | Age: 76
End: 2021-09-27
Payer: MEDICARE

## 2021-09-27 PROCEDURE — 93970 EXTREMITY STUDY: CPT

## 2021-10-01 DIAGNOSIS — R60.0 LOCALIZED EDEMA: ICD-10-CM

## 2021-10-15 ENCOUNTER — NON-APPOINTMENT (OUTPATIENT)
Age: 76
End: 2021-10-15

## 2021-11-18 ENCOUNTER — APPOINTMENT (OUTPATIENT)
Dept: VASCULAR SURGERY | Facility: HOSPITAL | Age: 76
End: 2021-11-18

## 2021-11-18 RX ORDER — ACETAMINOPHEN AND CODEINE 300; 30 MG/1; MG/1
300-30 TABLET ORAL
Qty: 20 | Refills: 0 | Status: ACTIVE | COMMUNITY
Start: 2021-11-18 | End: 1900-01-01

## 2021-12-17 ENCOUNTER — APPOINTMENT (OUTPATIENT)
Dept: VASCULAR SURGERY | Facility: CLINIC | Age: 76
End: 2021-12-17
Payer: MEDICARE

## 2021-12-17 VITALS
HEIGHT: 59 IN | SYSTOLIC BLOOD PRESSURE: 171 MMHG | TEMPERATURE: 97 F | DIASTOLIC BLOOD PRESSURE: 61 MMHG | RESPIRATION RATE: 16 BRPM | HEART RATE: 76 BPM | OXYGEN SATURATION: 93 %

## 2021-12-17 PROCEDURE — 99024 POSTOP FOLLOW-UP VISIT: CPT

## 2021-12-17 NOTE — PHYSICAL EXAM
[Alert] : alert [Oriented to Person] : oriented to person [Oriented to Place] : oriented to place [Oriented to Time] : oriented to time [de-identified] : Awake and Alert [FreeTextEntry1] : AVF with palpable thrill [de-identified] : left hand warm and pink, incision healing well [de-identified] : No gross motor or sensory deficits left hand [de-identified] : Appropriate

## 2021-12-17 NOTE — DISCUSSION/SUMMARY
[FreeTextEntry1] : 77 yo female with ESRD. She is s/p creation of a left radial cephalic AVF. The AVF is clinically patent. She will follow up in 4 weeks for a HD access u/s. I am hopeful that her occasional numbness will resolve with time. I do not think there is an indication to intervene at this time. \par \par Continue HD via the PC

## 2021-12-17 NOTE — REVIEW OF SYSTEMS
[Fever] : no fever [Chills] : no chills [As Noted in HPI] : as noted in HPI [Limb Weakness] : no limb weakness [Difficulty Walking] : no difficulty walking

## 2021-12-17 NOTE — REASON FOR VISIT
[de-identified] : s/p left radial cephalic AVF [de-identified] : 77 yo female with ESRD s/p a left radial cephalic AVF. She reports occasional numbness along the ulnar aspect of her hand. She denies fever or chills. She continues to undergo HD via a PC. [Family Member] : family member

## 2022-01-07 ENCOUNTER — APPOINTMENT (OUTPATIENT)
Dept: VASCULAR SURGERY | Facility: CLINIC | Age: 77
End: 2022-01-07
Payer: MEDICARE

## 2022-01-07 ENCOUNTER — APPOINTMENT (OUTPATIENT)
Dept: HEART AND VASCULAR | Facility: CLINIC | Age: 77
End: 2022-01-07
Payer: MEDICARE

## 2022-01-07 DIAGNOSIS — Z99.2 END STAGE RENAL DISEASE: ICD-10-CM

## 2022-01-07 DIAGNOSIS — N18.6 END STAGE RENAL DISEASE: ICD-10-CM

## 2022-01-07 PROCEDURE — 93971 EXTREMITY STUDY: CPT

## 2022-01-07 PROCEDURE — 99024 POSTOP FOLLOW-UP VISIT: CPT

## 2022-01-07 NOTE — REASON FOR VISIT
[de-identified] : s/p left radial cephalic AVF [de-identified] : 75 yo female with ESRD s/p a left radial cephalic AVF. She reports that her hand numbness has resolved. She denies fever or chills. She continues to undergo HD via a PC. [Family Member] : family member

## 2022-01-07 NOTE — DISCUSSION/SUMMARY
[FreeTextEntry1] : 77 yo female with ESRD. She is s/p creation of a left radial cephalic AVF. The AVF is clinically patent and more prominent than it was at her last visit. She underwent a HD access ultrasound which demonstrated a patent fistula which was slightly small and with mildly decreased flow volume. I recommended that she undergo a maturation of the AVF. She is agreeable to the plan.\par \par Continue HD via the PC

## 2022-01-07 NOTE — PHYSICAL EXAM
[Alert] : alert [Oriented to Person] : oriented to person [Oriented to Place] : oriented to place [Oriented to Time] : oriented to time [de-identified] : Awake and Alert [FreeTextEntry1] : AVF with palpable thrill [de-identified] : left hand warm and pink, incision healed, AVF more prominent, several large branches [de-identified] : No gross motor or sensory deficits left hand [de-identified] : Appropriate

## 2022-08-09 RX ORDER — BUMETANIDE 2 MG/1
2 TABLET ORAL TWICE DAILY
Qty: 180 | Refills: 0 | Status: ACTIVE | COMMUNITY
Start: 2021-10-01 | End: 1900-01-01

## 2022-11-03 RX ORDER — CALCIUM ACETATE 667 MG
667 TABLET ORAL
Qty: 540 | Refills: 1 | Status: ACTIVE | COMMUNITY
Start: 2021-04-20 | End: 1900-01-01